# Patient Record
Sex: FEMALE | Race: WHITE | NOT HISPANIC OR LATINO | Employment: FULL TIME | URBAN - METROPOLITAN AREA
[De-identification: names, ages, dates, MRNs, and addresses within clinical notes are randomized per-mention and may not be internally consistent; named-entity substitution may affect disease eponyms.]

---
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
## 2022-01-18 ENCOUNTER — HISTORICAL (OUTPATIENT)
Dept: LAB | Facility: HOSPITAL | Age: 22
End: 2022-01-18

## 2022-02-07 ENCOUNTER — HISTORICAL (OUTPATIENT)
Dept: RADIOLOGY | Facility: HOSPITAL | Age: 22
End: 2022-02-07

## 2022-02-07 ENCOUNTER — HISTORICAL (OUTPATIENT)
Dept: ADMINISTRATIVE | Facility: HOSPITAL | Age: 22
End: 2022-02-07

## 2022-04-30 NOTE — OP NOTE
PREOPERATIVE DIAGNOSIS:  Right breast fibroadenoma.    POSTOPERATIVE DIAGNOSIS:  Right breast fibroadenoma.    OPERATION:  Excision of right breast fibroadenoma.    ASSISTANT:  OR staff.    ESTIMATED BLOOD LOSS:  Minimal.    COMPLICATIONS:  None.    SPECIMENS:  Right breast fibroadenoma with stitch marks, short superior, long lateral, double deep.    OPERATIVE REPORT:  This is a 17-year-old young lady who has a right breast mass confirmed with pathology to be a fibroadenoma.  This is bothersome and painful to her and also the mom has concern for the discomfort that it is causing and also the discomfort for possibility of growth in the future.  Both the patient and the mother were desiring excision.  They are aware of the risks, benefits, and alternatives.  They are aware of the alternative to monitor with time.  They wished to proceed with excision.     Patient was brought to the operating room, placed in supine position.  After marking the area for excision preoperatively, anesthesia was induced and the airway was controlled.  The right breast was prepped and draped in a sterile manner.  Skin flaps were created and full circumferential dissection around the right fibroadenoma at the 4 o'clock position was performed before completely removing the lesion.  Marking stitches were placed, short superior, long lateral, double deep.  The lesion was completely excised and submitted to Pathology.  Otherwise, hemostasis was ensured.  The wound was irrigated.  Deep dermal stitches of 3-0 Vicryl were     placed, and otherwise skin was closed with 4-0 plain gut in a subcuticular manner.  The patient tolerated the procedure fine and there were no complications.        LUCERO/RAEANN   DD: 12/18/2017 0959   DT: 12/18/2017 1048  Job # [id number]

## 2022-06-15 ENCOUNTER — LAB VISIT (OUTPATIENT)
Dept: LAB | Facility: HOSPITAL | Age: 22
End: 2022-06-15
Attending: PHYSICIAN ASSISTANT
Payer: MEDICAID

## 2022-06-15 DIAGNOSIS — R14.1 FLATULENCE, ERUCTATION, AND GAS PAIN: ICD-10-CM

## 2022-06-15 DIAGNOSIS — R63.4 LOSS OF WEIGHT: Primary | ICD-10-CM

## 2022-06-15 DIAGNOSIS — R14.3 FLATULENCE, ERUCTATION, AND GAS PAIN: ICD-10-CM

## 2022-06-15 DIAGNOSIS — K52.89 SOAP COLITIS: ICD-10-CM

## 2022-06-15 DIAGNOSIS — T47.4X5A SOAP COLITIS: ICD-10-CM

## 2022-06-15 DIAGNOSIS — R11.2 NAUSEA WITH VOMITING: ICD-10-CM

## 2022-06-15 DIAGNOSIS — R10.13 ABDOMINAL PAIN, EPIGASTRIC: ICD-10-CM

## 2022-06-15 DIAGNOSIS — R14.2 FLATULENCE, ERUCTATION, AND GAS PAIN: ICD-10-CM

## 2022-06-15 LAB — TSH SERPL-ACNC: 1.25 UIU/ML (ref 0.35–4.94)

## 2022-06-15 PROCEDURE — 36415 COLL VENOUS BLD VENIPUNCTURE: CPT

## 2022-06-15 PROCEDURE — 84443 ASSAY THYROID STIM HORMONE: CPT

## 2022-06-15 PROCEDURE — 82784 ASSAY IGA/IGD/IGG/IGM EACH: CPT

## 2022-06-15 PROCEDURE — 83516 IMMUNOASSAY NONANTIBODY: CPT

## 2022-06-17 LAB
IGA SERPL-MCNC: 177 MG/DL (ref 61–356)
IMMUNOLOGIST REVIEW: NORMAL
TTG IGA SER IA-ACNC: <1.2 U/ML

## 2022-08-26 ENCOUNTER — HOSPITAL ENCOUNTER (EMERGENCY)
Facility: HOSPITAL | Age: 22
Discharge: HOME | End: 2022-08-26
Attending: STUDENT IN AN ORGANIZED HEALTH CARE EDUCATION/TRAINING PROGRAM
Payer: MEDICAID

## 2022-08-26 VITALS
TEMPERATURE: 98 F | OXYGEN SATURATION: 100 % | SYSTOLIC BLOOD PRESSURE: 128 MMHG | BODY MASS INDEX: 19.92 KG/M2 | RESPIRATION RATE: 18 BRPM | HEIGHT: 59 IN | DIASTOLIC BLOOD PRESSURE: 81 MMHG | HEART RATE: 70 BPM | WEIGHT: 98.81 LBS

## 2022-08-26 DIAGNOSIS — L50.9 URTICARIA: ICD-10-CM

## 2022-08-26 DIAGNOSIS — T78.40XA ALLERGIC REACTION, INITIAL ENCOUNTER: Primary | ICD-10-CM

## 2022-08-26 PROCEDURE — 99284 EMERGENCY DEPT VISIT MOD MDM: CPT | Mod: 25

## 2022-08-26 RX ORDER — PREDNISONE 20 MG/1
20 TABLET ORAL
Status: DISCONTINUED | OUTPATIENT
Start: 2022-08-26 | End: 2022-08-26

## 2022-08-26 RX ORDER — METOCLOPRAMIDE 10 MG/1
10 TABLET ORAL 4 TIMES DAILY
COMMUNITY
Start: 2022-07-06 | End: 2023-09-29 | Stop reason: CLARIF

## 2022-08-26 RX ORDER — PREDNISONE 10 MG/1
10 TABLET ORAL DAILY
Qty: 21 TABLET | Refills: 0 | Status: SHIPPED | OUTPATIENT
Start: 2022-08-26 | End: 2023-09-29 | Stop reason: CLARIF

## 2022-08-26 RX ORDER — ESCITALOPRAM OXALATE 10 MG/1
10 TABLET ORAL DAILY
COMMUNITY
Start: 2022-07-13

## 2022-08-26 RX ORDER — HYDROXYZINE PAMOATE 25 MG/1
25 CAPSULE ORAL EVERY 6 HOURS PRN
Qty: 15 CAPSULE | Refills: 0 | Status: SHIPPED | OUTPATIENT
Start: 2022-08-26 | End: 2022-08-31

## 2022-08-26 RX ORDER — PANTOPRAZOLE SODIUM 40 MG/1
40 TABLET, DELAYED RELEASE ORAL DAILY
COMMUNITY
Start: 2022-07-09 | End: 2023-09-29 | Stop reason: CLARIF

## 2022-08-27 NOTE — ED PROVIDER NOTES
Source of History:  Patient    Chief complaint:  Rash (Pt to ED c/o generalized rash starting this evening, reports abdominal pain and 1 episode of vomiting PTA, denies any trouble breathing)      HPI:  Lara Mccann is a 21 y.o. female presenting with rash to chest neck arms and some on the bilateral knees.  Patient states this started acutely while she was at work after testing with some different foods.  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  She denies any other contacts including environmental or organic.  She does report that she started feeling very hot and did have some itching in her throat which quickly resolved and then she began having the itching to the arms.  There is no obvious rash seen at this time but there is some superficial abrasions from her itching heavily.  She does take medication for anxiety and depression as well as some medications for her stomach but takes these medicines routinely and has been taking these for some time.  She has no other new medications at this time.    This is the extent to the patients complaints today here in the emergency department.    ROS: As per HPI and below:  General: No fever.  No chills.  Eyes: No visual changes.  ENT: No sore throat. No ear pain  Head: No headache.    Chest: No shortness of breath. No cough.  Cardiovascular: No chest pain.  Abdomen: No abdominal pain.  No nausea or vomiting.  Genito-Urinary: No abnormal urination.  Neurologic: No focal weakness.  No numbness.  MSK: No myalgias. No arthralgias.   Integument:  Itching to arms, neck, knees.  Psych: No confusion      Review of patient's allergies indicates:  No Known Allergies    PMH:  As per HPI and below:  Past Medical History:   Diagnosis Date    Anxiety disorder, unspecified      History reviewed. No pertinent surgical history.         Physical Exam:    /81 (BP Location: Right arm, Patient Position: Sitting)   Pulse 70   " Temp 97.5 °F (36.4 °C) (Oral)   Resp 18   Ht 4' 11" (1.499 m)   Wt 44.8 kg (98 lb 12.8 oz)   SpO2 100%   Breastfeeding No   BMI 19.96 kg/m²   Nursing note and vital signs reviewed.  Appearance: Afebrile. Not toxic appearing. No acute distress.  Head: Atraumatic  Eyes: No conjunctival injection. No scleral icterus  ENT: Normal phonation  Chest/ Respiratory: No respiratory distress. No accessory muscle use.  Cardiovascular: Regular rate   Abdomen:  Not distended.    Musculoskeletal: Good range of motion all joints.  No deformities.  Neck supple.  No meningismus.  Skin:  Some red erythematous abrasions to the skin but no obvious rashes or lesions seen at this time.  She states that there was earlier that quickly resolved..  Good turgor.  No ecchymoses.  Neurologic: GCS 15. Ambulates with a steady gait.   Mental Status:  Alert and oriented x 3.  Appropriate, conversant    Labs that have been ordered have been independently reviewed and interpreted by myself.    I decided to obtain the patient's medical records.  Summary of Medical Records:  Nursing documentation    Initial Impression/ Differential Dx:  Contact dermatitis, allergic reaction, viral exanthem.    MDM:    21 y.o. female with itching presents to the emergency department for evaluation.  I believe that this must have been something that she ate tonight while at work but she reports that most of the things that she did eat tonight head been eaten in the past.  Patient did have some itching in the throat but does not have any itching at this time.  She has no respiratory distress.  She is breathing equally with normal chest rise and fall in no distress.  Discuss antihistamine treatment as well as steroids and encouraged her have early to follow-up with PCP before allergy specialist referral.    ED Course as of 08/26/22 2152   Fri Aug 26, 2022   2150 Patient does not have a ride and refused to get someone to assist her so we could administer Benadryl at " this time.  She states that she will take this when she gets home. [SL]      ED Course User Index  [SL] AARON Peters                 Diagnostic Impression:    1. Allergic reaction, initial encounter    2. Urticaria         ED Disposition Condition    Discharge Stable          ED Prescriptions     Medication Sig Dispense Start Date End Date Auth. Provider    predniSONE (DELTASONE) 10 MG tablet Take 1 tablet (10 mg total) by mouth once daily. Take 4 tabs x 3 days, then Take 2 tabs x 3 days, then Take 1 tab x 3 days. 21 tablet 8/26/2022  AARON Peters    hydrOXYzine pamoate (VISTARIL) 25 MG Cap Take 1 capsule (25 mg total) by mouth every 6 (six) hours as needed (itching). 15 capsule 8/26/2022 8/31/2022 AARON Peters        Follow-up Information     Follow up With Specialties Details Why Contact Info    AARON Pop [facility] Call in 1 week For ER Follow Up. [address] [facility] [address]  [phone]             AARON Peters  08/26/22 4317

## 2022-09-22 ENCOUNTER — HISTORICAL (OUTPATIENT)
Dept: ADMINISTRATIVE | Facility: HOSPITAL | Age: 22
End: 2022-09-22
Payer: MEDICAID

## 2023-04-17 ENCOUNTER — HOSPITAL ENCOUNTER (OUTPATIENT)
Dept: RADIOLOGY | Facility: HOSPITAL | Age: 23
Discharge: HOME | End: 2023-04-17
Attending: NURSE PRACTITIONER
Payer: MEDICAID

## 2023-04-17 DIAGNOSIS — N63.20 BILATERAL BREAST LUMP: ICD-10-CM

## 2023-04-17 DIAGNOSIS — N63.10 BILATERAL BREAST LUMP: ICD-10-CM

## 2023-04-17 PROCEDURE — 76641 ULTRASOUND BREAST COMPLETE: CPT | Mod: TC,50

## 2023-08-22 ENCOUNTER — LAB VISIT (OUTPATIENT)
Dept: LAB | Facility: HOSPITAL | Age: 23
End: 2023-08-22
Attending: OBSTETRICS & GYNECOLOGY
Payer: MEDICAID

## 2023-08-22 DIAGNOSIS — N92.5 RETROGRADE MENSTRUATION: Primary | ICD-10-CM

## 2023-08-22 LAB — B-HCG FREE SERPL-ACNC: <2.42 MIU/ML

## 2023-08-22 PROCEDURE — 84702 CHORIONIC GONADOTROPIN TEST: CPT

## 2023-08-22 PROCEDURE — 36415 COLL VENOUS BLD VENIPUNCTURE: CPT

## 2023-09-29 ENCOUNTER — HOSPITAL ENCOUNTER (EMERGENCY)
Facility: HOSPITAL | Age: 23
Discharge: HOME | End: 2023-09-29
Attending: INTERNAL MEDICINE
Payer: MEDICAID

## 2023-09-29 VITALS
HEIGHT: 59 IN | DIASTOLIC BLOOD PRESSURE: 67 MMHG | WEIGHT: 107 LBS | SYSTOLIC BLOOD PRESSURE: 114 MMHG | BODY MASS INDEX: 21.57 KG/M2 | HEART RATE: 90 BPM | OXYGEN SATURATION: 100 % | RESPIRATION RATE: 16 BRPM | TEMPERATURE: 97 F

## 2023-09-29 DIAGNOSIS — M62.830 SPASM OF MUSCLE OF LOWER BACK: Primary | ICD-10-CM

## 2023-09-29 LAB
ALBUMIN SERPL-MCNC: 4.5 G/DL (ref 3.5–5)
ALBUMIN/GLOB SERPL: 1.5 RATIO (ref 1.1–2)
ALP SERPL-CCNC: 54 UNIT/L (ref 40–150)
ALT SERPL-CCNC: 11 UNIT/L (ref 0–55)
APPEARANCE UR: CLEAR
AST SERPL-CCNC: 17 UNIT/L (ref 5–34)
B-HCG SERPL QL: NEGATIVE
BACTERIA #/AREA URNS AUTO: ABNORMAL /HPF
BASOPHILS # BLD AUTO: 0.04 X10(3)/MCL
BASOPHILS NFR BLD AUTO: 0.5 %
BILIRUB SERPL-MCNC: 0.4 MG/DL
BILIRUB UR QL STRIP.AUTO: NEGATIVE
BUN SERPL-MCNC: 6 MG/DL (ref 7–18.7)
CALCIUM SERPL-MCNC: 9.9 MG/DL (ref 8.4–10.2)
CHLORIDE SERPL-SCNC: 105 MMOL/L (ref 98–107)
CO2 SERPL-SCNC: 23 MMOL/L (ref 22–29)
COLOR UR AUTO: YELLOW
CREAT SERPL-MCNC: 0.6 MG/DL (ref 0.55–1.02)
EOSINOPHIL # BLD AUTO: 0.1 X10(3)/MCL (ref 0–0.9)
EOSINOPHIL NFR BLD AUTO: 1.2 %
ERYTHROCYTE [DISTWIDTH] IN BLOOD BY AUTOMATED COUNT: 13.6 % (ref 11.5–17)
GFR SERPLBLD CREATININE-BSD FMLA CKD-EPI: >60 MLS/MIN/1.73/M2
GLOBULIN SER-MCNC: 3 GM/DL (ref 2.4–3.5)
GLUCOSE SERPL-MCNC: 88 MG/DL (ref 74–100)
GLUCOSE UR QL STRIP.AUTO: NEGATIVE
HCT VFR BLD AUTO: 41.2 % (ref 37–47)
HGB BLD-MCNC: 12.9 G/DL (ref 12–16)
IMM GRANULOCYTES # BLD AUTO: 0.04 X10(3)/MCL (ref 0–0.04)
IMM GRANULOCYTES NFR BLD AUTO: 0.5 %
KETONES UR QL STRIP.AUTO: ABNORMAL
LEUKOCYTE ESTERASE UR QL STRIP.AUTO: NEGATIVE
LIPASE SERPL-CCNC: 5 U/L
LYMPHOCYTES # BLD AUTO: 2.36 X10(3)/MCL (ref 0.6–4.6)
LYMPHOCYTES NFR BLD AUTO: 27.3 %
MCH RBC QN AUTO: 27.9 PG (ref 27–31)
MCHC RBC AUTO-ENTMCNC: 31.3 G/DL (ref 33–36)
MCV RBC AUTO: 89.2 FL (ref 80–94)
MONOCYTES # BLD AUTO: 0.77 X10(3)/MCL (ref 0.1–1.3)
MONOCYTES NFR BLD AUTO: 8.9 %
MUCOUS THREADS URNS QL MICRO: ABNORMAL /LPF
NEUTROPHILS # BLD AUTO: 5.32 X10(3)/MCL (ref 2.1–9.2)
NEUTROPHILS NFR BLD AUTO: 61.6 %
NITRITE UR QL STRIP.AUTO: NEGATIVE
PH UR STRIP.AUTO: 6 [PH]
PLATELET # BLD AUTO: 359 X10(3)/MCL (ref 130–400)
PMV BLD AUTO: 10 FL (ref 7.4–10.4)
POTASSIUM SERPL-SCNC: 4 MMOL/L (ref 3.5–5.1)
PROT SERPL-MCNC: 7.5 GM/DL (ref 6.4–8.3)
PROT UR QL STRIP.AUTO: ABNORMAL
RBC # BLD AUTO: 4.62 X10(6)/MCL (ref 4.2–5.4)
RBC #/AREA URNS AUTO: ABNORMAL /HPF
RBC UR QL AUTO: ABNORMAL
SODIUM SERPL-SCNC: 137 MMOL/L (ref 136–145)
SP GR UR STRIP.AUTO: >=1.03 (ref 1–1.03)
SQUAMOUS #/AREA URNS AUTO: ABNORMAL /HPF
UROBILINOGEN UR STRIP-ACNC: 0.2
WBC # SPEC AUTO: 8.63 X10(3)/MCL (ref 4.5–11.5)
WBC #/AREA URNS AUTO: ABNORMAL /HPF

## 2023-09-29 PROCEDURE — 81001 URINALYSIS AUTO W/SCOPE: CPT | Performed by: NURSE PRACTITIONER

## 2023-09-29 PROCEDURE — 96372 THER/PROPH/DIAG INJ SC/IM: CPT | Performed by: NURSE PRACTITIONER

## 2023-09-29 PROCEDURE — 80053 COMPREHEN METABOLIC PANEL: CPT | Performed by: NURSE PRACTITIONER

## 2023-09-29 PROCEDURE — 81025 URINE PREGNANCY TEST: CPT | Performed by: NURSE PRACTITIONER

## 2023-09-29 PROCEDURE — 63600175 PHARM REV CODE 636 W HCPCS: Performed by: NURSE PRACTITIONER

## 2023-09-29 PROCEDURE — 99285 EMERGENCY DEPT VISIT HI MDM: CPT | Mod: 25

## 2023-09-29 PROCEDURE — 85025 COMPLETE CBC W/AUTO DIFF WBC: CPT | Performed by: NURSE PRACTITIONER

## 2023-09-29 PROCEDURE — 83690 ASSAY OF LIPASE: CPT | Performed by: NURSE PRACTITIONER

## 2023-09-29 RX ORDER — KETOROLAC TROMETHAMINE 30 MG/ML
30 INJECTION, SOLUTION INTRAMUSCULAR; INTRAVENOUS
Status: COMPLETED | OUTPATIENT
Start: 2023-09-29 | End: 2023-09-29

## 2023-09-29 RX ORDER — METHOCARBAMOL 500 MG/1
500 TABLET, FILM COATED ORAL 3 TIMES DAILY
Qty: 15 TABLET | Refills: 0 | Status: SHIPPED | OUTPATIENT
Start: 2023-09-29 | End: 2023-10-04

## 2023-09-29 RX ORDER — NAPROXEN 500 MG/1
500 TABLET ORAL 2 TIMES DAILY
Qty: 28 TABLET | Refills: 0 | Status: SHIPPED | OUTPATIENT
Start: 2023-09-29 | End: 2023-10-13

## 2023-09-29 RX ADMIN — KETOROLAC TROMETHAMINE 30 MG: 30 INJECTION, SOLUTION INTRAMUSCULAR; INTRAVENOUS at 07:09

## 2023-09-29 NOTE — Clinical Note
"Lara Jorgensena" Siobhan was seen and treated in our emergency department on 9/29/2023.  She may return to work on 10/01/2023.       If you have any questions or concerns, please don't hesitate to call.       RN    "

## 2023-09-29 NOTE — ED TRIAGE NOTES
Patient complaint of left flank pain constant since last night. Took Ibuprofen 800mg at 1300. Denies injury, fall, N/V/D, chest or neck pain, dizziness or fever. Last BM 30 minutes PTA. Urine specimen collected in lab.

## 2023-09-30 NOTE — ED PROVIDER NOTES
Encounter Date: 9/29/2023       History     Chief Complaint   Patient presents with    Flank Pain     Left flank pain and nausea     Patient is a 22-year-old female presents emerged department complaints of left lower back pain.  She states the pain is a cramping/sharp pain it does radiate down into her hip and she feels like she has swollen area and lower back that she has not noticed before.  She states pain started last night denies any injury or trauma.  She denies any hematuria or dysuria but does report urinary frequency.  She denies any fevers chills.  She denies any other complaints or associated symptoms.      Review of patient's allergies indicates:  No Known Allergies  Past Medical History:   Diagnosis Date    Anxiety disorder, unspecified      No past surgical history on file.  No family history on file.     Review of Systems   Constitutional:  Negative for activity change, appetite change and fever.   HENT:  Negative for congestion, dental problem and sore throat.    Eyes:  Negative for discharge and itching.   Respiratory:  Negative for apnea, chest tightness and shortness of breath.    Cardiovascular:  Negative for chest pain.   Gastrointestinal:  Negative for abdominal distention, abdominal pain and nausea.   Genitourinary:  Positive for flank pain. Negative for dysuria, vaginal discharge and vaginal pain.   Musculoskeletal:  Positive for back pain and myalgias. Negative for neck pain and neck stiffness.   Skin:  Negative for rash.   Neurological:  Negative for weakness.   Hematological:  Does not bruise/bleed easily.   Psychiatric/Behavioral:  Negative for agitation and behavioral problems.    All other systems reviewed and are negative.      Physical Exam     Initial Vitals [09/29/23 1600]   BP Pulse Resp Temp SpO2   113/66 93 16 97.4 °F (36.3 °C) 100 %      MAP       --         Physical Exam    Nursing note and vitals reviewed.  Constitutional: Vital signs are normal. She appears well-developed  and well-nourished.  Non-toxic appearance. She does not have a sickly appearance.   HENT:   Head: Normocephalic and atraumatic.   Right Ear: External ear normal.   Left Ear: External ear normal.   Eyes: Conjunctivae, EOM and lids are normal. Lids are everted and swept, no foreign bodies found.   Neck: Trachea normal and phonation normal. Neck supple. No thyroid mass and no thyromegaly present.   Normal range of motion.   Full passive range of motion without pain.     Cardiovascular:  Normal rate, regular rhythm, S1 normal, S2 normal, normal heart sounds, intact distal pulses and normal pulses.           Pulmonary/Chest: Breath sounds normal.   Musculoskeletal:         General: Tenderness present. Normal range of motion.      Cervical back: Full passive range of motion without pain, normal range of motion and neck supple.      Comments: Tender to left lower back.     Lymphadenopathy:     She has no cervical adenopathy.   Neurological: She is alert and oriented to person, place, and time. She has normal strength. GCS score is 15. GCS eye subscore is 4. GCS verbal subscore is 5. GCS motor subscore is 6.   Skin: Skin is warm, dry and intact. Capillary refill takes less than 2 seconds.   Psychiatric: She has a normal mood and affect. Her speech is normal and behavior is normal. Judgment normal. Cognition and memory are normal.         ED Course   Procedures  Labs Reviewed   URINALYSIS, REFLEX TO URINE CULTURE - Abnormal; Notable for the following components:       Result Value    Protein, UA Trace (*)     Ketones, UA 1+ (*)     Blood, UA Trace-Intact (*)     All other components within normal limits   URINALYSIS, MICROSCOPIC - Abnormal; Notable for the following components:    Bacteria, UA Many (*)     Mucous, UA Small (*)     Squamous Epithelial Cells, UA Moderate (*)     All other components within normal limits   COMPREHENSIVE METABOLIC PANEL - Abnormal; Notable for the following components:    Blood Urea Nitrogen 6.0  (*)     All other components within normal limits   CBC WITH DIFFERENTIAL - Abnormal; Notable for the following components:    MCHC 31.3 (*)     All other components within normal limits   HCG QUALITATIVE URINE - Normal   LIPASE - Normal   CBC W/ AUTO DIFFERENTIAL    Narrative:     The following orders were created for panel order CBC auto differential.  Procedure                               Abnormality         Status                     ---------                               -----------         ------                     CBC with Differential[[id number]]        Abnormal            Final result                 Please view results for these tests on the individual orders.          Imaging Results              CT Renal Stone Study ABD Pelvis WO (Final result)  Result time 09/29/23 18:43:50      Final result by Dennis Mederos MD (09/29/23 18:43:50)                   Impression:      No acute abnormality of the abdomen and pelvis.      Electronically signed by: Dennis Mederos MD  Date:    09/29/2023  Time:    18:43               Narrative:    EXAMINATION:  CT RENAL STONE STUDY ABD PELVIS WO    CLINICAL HISTORY:  Flank pain, kidney stone suspected;    TECHNIQUE:  Axial images of the abdomen and pelvis were obtained without IV contrast administration.  Coronal and sagittal reconstructions were provided.  Three dimensional and MIP images were obtained and evaluated.  Total DLP was 111 mGy-cm. Dose lowering technique and automated exposure control were utilized for this exam.    COMPARISON:  CT of the abdomen and pelvis 02/07/2022.    FINDINGS:  The bilateral lung bases are clear.  The heart is normal in size.    The liver is homogeneous in attenuation.  The gallbladder is surgically absent.  The spleen, pancreas, and adrenal glands are normal.  The bilateral kidneys are normal.  There is no hydronephrosis or nephrolithiasis.  There is no urolithiasis.  The urinary bladder is decompressed.    The stomach and small bowel are  decompressed.  There is no small bowel obstruction.  The appendix is not definitively visualized, however there is no right lower quadrant inflammatory stranding.    The colon is normal.  The uterus and adnexa are normal for age.  There is no pelvic or retroperitoneal adenopathy.  The aorta is nonaneurysmal.  There is no lytic or blastic osseous lesion.                                       Medications   ketorolac injection 30 mg (has no administration in time range)     Medical Decision Making  22-year-old female presents emerged department complaints of left flank/left lower back.  She states the pain is an aching sharp in fluctuating intensity.  She denies any injury trauma.    Problems Addressed:  Spasm of muscle of lower back: acute illness or injury     Details: After all results returned essentially negative including a CT scan the patient began to describe the pain more and states it is even worse with certain movements and twisting.  I had the patient lie flat on her stomach and checked her alignment to find that her left foot is almost an inch longer than the right I explained her this could be due to some misalignment suggested she the contact chiropractor or massage therapist.  Will give IM medication here for pain and sent home with similar for muscle relaxer and pain relief.  Discussed stretching of the lower back as well as heat and ice.  Discussed topical rubs as well.  Strict ED return precautions discussed rain changing worsening symptoms.    Amount and/or Complexity of Data Reviewed  External Data Reviewed: labs, radiology and notes.  Labs: ordered. Decision-making details documented in ED Course.  Radiology: ordered. Decision-making details documented in ED Course.    Risk  Prescription drug management.               ED Course as of 09/29/23 1919   Fri Sep 29, 2023   1917 After all results returned essentially negative including a CT scan the patient began to describe the pain more and states it  is even worse with certain movements and twisting.  I had the patient lie flat on her stomach and checked her alignment to find that her left foot is almost an inch longer than the right I explained her this could be due to some misalignment suggested she the contact chiropractor or massage therapist.  Will give IM medication here for pain and sent home with similar for muscle relaxer and pain relief.  Discussed stretching of the lower back as well as heat and ice.  Discussed topical rubs as well. [SL]      ED Course User Index  [SL] Jamison Mazariegos FNP                    Clinical Impression:   Final diagnoses:  [M62.830] Spasm of muscle of lower back (Primary)        ED Disposition Condition    Discharge Stable          ED Prescriptions       Medication Sig Dispense Start Date End Date Auth. Provider    naproxen (NAPROSYN) 500 MG tablet Take 1 tablet (500 mg total) by mouth 2 (two) times daily. for 14 days 28 tablet 9/29/2023 10/13/2023 Jamison Mazariegos FNP    methocarbamoL (ROBAXIN) 500 MG Tab Take 1 tablet (500 mg total) by mouth 3 (three) times daily. for 5 days 15 tablet 9/29/2023 10/4/2023 Jamison Mazariegos FNP          Follow-up Information       Follow up With Specialties Details Why Contact Info    Laura Sidhu FNP Family Medicine Schedule an appointment as soon as possible for a visit  As needed, For ER Follow Up. [address]  [facility] [address]  [phone]               Jamison Mazariegos FNP  09/29/23 8904

## 2024-03-25 ENCOUNTER — HOSPITAL ENCOUNTER (EMERGENCY)
Facility: HOSPITAL | Age: 24
Discharge: HOME | End: 2024-03-25
Attending: INTERNAL MEDICINE
Payer: MEDICAID

## 2024-03-25 VITALS
OXYGEN SATURATION: 99 % | RESPIRATION RATE: 16 BRPM | HEART RATE: 87 BPM | DIASTOLIC BLOOD PRESSURE: 78 MMHG | WEIGHT: 103 LBS | HEIGHT: 59 IN | BODY MASS INDEX: 20.76 KG/M2 | TEMPERATURE: 98 F | SYSTOLIC BLOOD PRESSURE: 110 MMHG

## 2024-03-25 DIAGNOSIS — K52.9 GASTROENTERITIS: Primary | ICD-10-CM

## 2024-03-25 LAB
APPEARANCE UR: CLEAR
B-HCG SERPL QL: NEGATIVE
BACTERIA #/AREA URNS AUTO: ABNORMAL /HPF
BILIRUB UR QL STRIP.AUTO: NEGATIVE
COLOR UR AUTO: YELLOW
GLUCOSE UR QL STRIP.AUTO: NEGATIVE
KETONES UR QL STRIP.AUTO: NEGATIVE
LEUKOCYTE ESTERASE UR QL STRIP.AUTO: NEGATIVE
NITRITE UR QL STRIP.AUTO: NEGATIVE
PH UR STRIP.AUTO: 8.5 [PH]
PROT UR QL STRIP.AUTO: ABNORMAL
RBC #/AREA URNS AUTO: ABNORMAL /HPF
RBC UR QL AUTO: NEGATIVE
SP GR UR STRIP.AUTO: 1.01 (ref 1–1.03)
SQUAMOUS #/AREA URNS AUTO: ABNORMAL /HPF
UROBILINOGEN UR STRIP-ACNC: 0.2
WBC #/AREA URNS AUTO: ABNORMAL /HPF

## 2024-03-25 PROCEDURE — 63600175 PHARM REV CODE 636 W HCPCS: Performed by: INTERNAL MEDICINE

## 2024-03-25 PROCEDURE — 81001 URINALYSIS AUTO W/SCOPE: CPT | Performed by: INTERNAL MEDICINE

## 2024-03-25 PROCEDURE — 96372 THER/PROPH/DIAG INJ SC/IM: CPT | Performed by: INTERNAL MEDICINE

## 2024-03-25 PROCEDURE — 99284 EMERGENCY DEPT VISIT MOD MDM: CPT | Mod: 25

## 2024-03-25 PROCEDURE — 81025 URINE PREGNANCY TEST: CPT | Performed by: INTERNAL MEDICINE

## 2024-03-25 RX ORDER — PROCHLORPERAZINE EDISYLATE 5 MG/ML
10 INJECTION INTRAMUSCULAR; INTRAVENOUS
Status: COMPLETED | OUTPATIENT
Start: 2024-03-25 | End: 2024-03-25

## 2024-03-25 RX ORDER — ONDANSETRON 4 MG/1
4 TABLET, ORALLY DISINTEGRATING ORAL EVERY 8 HOURS PRN
Qty: 15 TABLET | Refills: 0 | Status: SHIPPED | OUTPATIENT
Start: 2024-03-25 | End: 2024-03-30

## 2024-03-25 RX ADMIN — PROCHLORPERAZINE EDISYLATE 10 MG: 5 INJECTION INTRAMUSCULAR; INTRAVENOUS at 07:03

## 2024-03-25 NOTE — ED PROVIDER NOTES
Encounter Date: 3/25/2024  History from patient     History     Chief Complaint   Patient presents with    Abdominal Pain    Vomiting    Diarrhea     C/o abd pain, vomiting and diarrhea since 3 this am     MAT Mccann is 23 y.o. female who  has a past medical history of Anxiety disorder, unspecified. arrives in ER with c/o Abdominal Pain, Vomiting, and Diarrhea (C/o abd pain, vomiting and diarrhea since 3 this am)      Review of patient's allergies indicates:  No Known Allergies  Past Medical History:   Diagnosis Date    Anxiety disorder, unspecified      Past Surgical History:   Procedure Laterality Date    BREAST FIBROADENOMA SURGERY Right     CHOLECYSTECTOMY       Family History   Problem Relation Age of Onset    Lupus Mother     Drug abuse Father      Social History     Tobacco Use    Smoking status: Every Day     Types: Vaping with nicotine    Smokeless tobacco: Never   Substance Use Topics    Alcohol use: Yes    Drug use: Never     Review of Systems   Constitutional:  Negative for fever.   HENT:  Negative for trouble swallowing and voice change.    Eyes:  Negative for visual disturbance.   Respiratory:  Negative for cough and shortness of breath.    Cardiovascular:  Negative for chest pain.   Gastrointestinal:  Positive for abdominal pain, diarrhea, nausea and vomiting.        Cramping   Genitourinary:  Negative for dysuria and hematuria.   Musculoskeletal:  Negative for back pain and gait problem.   Skin:  Negative for color change and rash.   Neurological:  Negative for headaches.   Psychiatric/Behavioral:  Negative for behavioral problems and sleep disturbance.    All other systems reviewed and are negative.      Physical Exam     Initial Vitals [03/25/24 0716]   BP Pulse Resp Temp SpO2   104/66 93 18 98.1 °F (36.7 °C) 100 %      MAP       --         Physical Exam    Nursing note and vitals reviewed.  Constitutional: She appears well-developed. No distress.   HENT:   Head: Atraumatic.    Mouth/Throat: Oropharynx is clear and moist.   Eyes: EOM are normal.   Cardiovascular:  Normal rate and regular rhythm.           Pulmonary/Chest: Breath sounds normal. No respiratory distress.   Abdominal: Abdomen is soft. Bowel sounds are normal. She exhibits no distension and no mass. There is abdominal tenderness.   Mild diffuse tenderness mainly in the lower abdomen There is no rebound and no guarding.   Musculoskeletal:         General: Normal range of motion.     Neurological: She is alert and oriented to person, place, and time.   Skin: Skin is warm and dry.   Psychiatric: She has a normal mood and affect.         ED Course   Procedures  Orders Placed This Encounter   Procedures    Urinalysis, Reflex to Urine Culture    Pregnancy, urine rapid    Urinalysis, Microscopic     Medications   prochlorperazine injection Soln 10 mg (10 mg Intramuscular Given 3/25/24 0735)     Admission on 03/25/2024   Component Date Value Ref Range Status    Color, UA 03/25/2024 Yellow  Yellow, Light-Yellow, Dark Yellow, Cherie, Straw Final    Appearance, UA 03/25/2024 Clear  Clear Final    Specific Gravity, UA 03/25/2024 1.015  1.005 - 1.030 Final    pH, UA 03/25/2024 8.5  5.0 - 8.5 Final    Protein, UA 03/25/2024 1+ (A)  Negative Final    Glucose, UA 03/25/2024 Negative  Negative, Normal Final    Ketones, UA 03/25/2024 Negative  Negative Final    Blood, UA 03/25/2024 Negative  Negative Final    Bilirubin, UA 03/25/2024 Negative  Negative Final    Urobilinogen, UA 03/25/2024 0.2  0.2, 1.0, Normal Final    Nitrites, UA 03/25/2024 Negative  Negative Final    Leukocyte Esterase, UA 03/25/2024 Negative  Negative Final    Beta hCG Qualitative, Urine 03/25/2024 Negative  Negative Final    Bacteria, UA 03/25/2024 Few (A)  None Seen, Rare, Occasional /HPF Final    RBC, UA 03/25/2024 3-5  None Seen, 0-2, 3-5, 0-5 /HPF Final    WBC, UA 03/25/2024 3-5  None Seen, 0-2, 3-5, 0-5 /HPF Final    Squamous Epithelial Cells, UA 03/25/2024 Few (A)   None Seen, Rare, Occasional, Occ /HPF Final         Labs Reviewed   URINALYSIS, REFLEX TO URINE CULTURE - Abnormal; Notable for the following components:       Result Value    Protein, UA 1+ (*)     All other components within normal limits   URINALYSIS, MICROSCOPIC - Abnormal; Notable for the following components:    Bacteria, UA Few (*)     Squamous Epithelial Cells, UA Few (*)     All other components within normal limits   PREGNANCY TEST, URINE RAPID - Normal          Imaging Results    None          Medications   prochlorperazine injection Soln 10 mg (10 mg Intramuscular Given 3/25/24 0735)     Medical Decision Making    Lara Mccann is 23 y.o. female who  has a past medical history of Anxiety disorder, unspecified. arrives in ER with c/o Abdominal Pain, Vomiting, and Diarrhea (C/o abd pain, vomiting and diarrhea since 3 this am)    Patient mainly ate some meat balls around 5 last night, went to sleep around 6, woke up at 3 in the morning with nausea vomiting and diarrhea and she had about 6 episodes of vomiting and 2 episodes of diarrhea then she decided to come to the emergency room because she is having some lower abdominal cramping pain.  Denies any other complaints.    On examination patient does have mild diffuse tenderness, mucus membranes are moist, there is no signs of dehydration, I decided that I am going to give her a shot of Compazine to help with nausea and vomiting and then she can hydrate herself orally, I will do a urine pregnancy test and a UA on her.    Problems Addressed:  Gastroenteritis: acute illness or injury     Details: Patient has a few epithelial cells and some bacteria in the urine but rest of the urine is essentially normal except for 1+ proteinuria.  I advised her that she can drink plenty of liquids and come back to the emergency room or see her family doctor if she continues to have nausea vomiting diarrhea after 24-48 hours.    Amount and/or Complexity of Data  Reviewed  Labs: ordered. Decision-making details documented in ED Course.    Risk  Prescription drug management.                                      Clinical Impression:  Final diagnoses:  [K52.9] Gastroenteritis (Primary)          ED Disposition Condition    Discharge Stable          ED Prescriptions       Medication Sig Dispense Start Date End Date Auth. Provider    ondansetron (ZOFRAN-ODT) 4 MG TbDL Take 1 tablet (4 mg total) by mouth every 8 (eight) hours as needed. 15 tablet 3/25/2024 3/30/2024 Pablo Becker MD          Follow-up Information       Follow up With Specialties Details Why Contact Info    Laura Sidhu, FNP-C Family Medicine In 2 days  [address] [facility] [address]  [phone]               Pablo Becker MD  03/25/24 4479

## 2024-03-25 NOTE — DISCHARGE INSTRUCTIONS
Drink plenty of liquids preferably Gatorade/Pedialyte/Powerade        Take medicines as prescribed    See your family doctor in one to 2 days for further evaluation, workup, and treatment as necessary    Avoid driving or operating machinery while taking medicines as some medicines might cause drowsiness and may cause problems. Also pain medicines have potential of being addictive  so use Pain meds specially Narcotics Sparingly.    The exam and treatment you received in Emergency Room was for an urgent problem and NOT INTENDED AS COMPLETE CARE. It is important that you FOLLOW UP with a doctor for ongoing care. If your symptoms become WORSE or you DO NOT IMPROVE and you are unable to reach your health care provider, you should RETURN to the emergency department. The Emergency Room doctor has provided a PRELIMINARY INTERPRETATION of all your STUDIES. A final interpretation may be done after you are discharged. IF A CHANGE in your diagnosis or treatment is needed WE WILL CONTACT YOU. It is critical that we have a CURRENT PHONE NUMBER FOR YOU.